# Patient Record
Sex: FEMALE | Race: WHITE | NOT HISPANIC OR LATINO | URBAN - METROPOLITAN AREA
[De-identification: names, ages, dates, MRNs, and addresses within clinical notes are randomized per-mention and may not be internally consistent; named-entity substitution may affect disease eponyms.]

---

## 2017-04-13 ENCOUNTER — OFFICE (OUTPATIENT)
Dept: URBAN - METROPOLITAN AREA CLINIC 19 | Facility: CLINIC | Age: 32
End: 2017-04-13

## 2017-04-13 VITALS
HEIGHT: 66 IN | SYSTOLIC BLOOD PRESSURE: 113 MMHG | HEART RATE: 73 BPM | DIASTOLIC BLOOD PRESSURE: 79 MMHG | WEIGHT: 148 LBS

## 2017-04-13 DIAGNOSIS — R10.12 LEFT UPPER QUADRANT PAIN: ICD-10-CM

## 2017-04-13 PROCEDURE — 99204 OFFICE O/P NEW MOD 45 MIN: CPT | Performed by: INTERNAL MEDICINE

## 2017-04-21 ENCOUNTER — OFFICE (OUTPATIENT)
Dept: URBAN - METROPOLITAN AREA CLINIC 22 | Facility: CLINIC | Age: 32
End: 2017-04-21

## 2017-04-21 DIAGNOSIS — R10.12 LEFT UPPER QUADRANT PAIN: ICD-10-CM

## 2017-04-21 PROCEDURE — 74177 CT ABD & PELVIS W/CONTRAST: CPT | Performed by: INTERNAL MEDICINE

## 2022-11-29 ENCOUNTER — OFFICE (OUTPATIENT)
Dept: URBAN - METROPOLITAN AREA CLINIC 19 | Facility: CLINIC | Age: 37
End: 2022-11-29
Payer: COMMERCIAL

## 2022-11-29 VITALS
HEIGHT: 66 IN | OXYGEN SATURATION: 97 % | HEART RATE: 94 BPM | WEIGHT: 141 LBS | DIASTOLIC BLOOD PRESSURE: 76 MMHG | SYSTOLIC BLOOD PRESSURE: 120 MMHG

## 2022-11-29 DIAGNOSIS — K62.5 HEMORRHAGE OF ANUS AND RECTUM: ICD-10-CM

## 2022-11-29 DIAGNOSIS — R19.5 OTHER FECAL ABNORMALITIES: ICD-10-CM

## 2022-11-29 DIAGNOSIS — R15.2 FECAL URGENCY: ICD-10-CM

## 2022-11-29 DIAGNOSIS — R19.4 CHANGE IN BOWEL HABIT: ICD-10-CM

## 2022-11-29 PROCEDURE — 99204 OFFICE O/P NEW MOD 45 MIN: CPT

## 2022-11-29 NOTE — SERVICENOTES
The patient's assessment was reviewed with Dr. Rucker and a collaborative plan of care was established.

## 2022-11-29 NOTE — SERVICEHPINOTES
36-year-old healthy single white female referred by her PCP for complaints of mucus and blood in her stool over the last month.  She admittedly started taking several new supplements ( omega-3 and vitamin E) a little over a month ago.  Shortly after starting these supplements, she started having increased mucus in her stool as well as blood in her stool and fecal urgency.  She had also been on antibiotics several weeks before after having a lymph node removed.  She discontinued the supplements recently and her symptoms actually have improved.  She does continue to have mucus in his noticed mucus in her stool in years past.  The blood is something she has not noticed before.  It is bright red blood and typically when she wipes.  She also was experiencing fecal urgency with multiple episodes of "looser" stools daily.  She denies any other GI symptoms, including reflux, heartburn, nausea, vomiting, dysphagia or abdominal pain.
br
br   She was last here 4/13/17 for complaints of LUQ pain.  CT abdomen/ pelvis 4/21/17 was unremarkable.  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 Family history is negative for IBD, pancreatitis or colon neoplasm.

## 2022-11-30 LAB
C-REACTIVE PROTEIN, QUANT: 1 MG/L (ref 0–10)
CBC, PLATELET, NO DIFFERENTIAL: HEMATOCRIT: 40.7 % (ref 34–46.6)
CBC, PLATELET, NO DIFFERENTIAL: HEMOGLOBIN: 13.5 G/DL (ref 11.1–15.9)
CBC, PLATELET, NO DIFFERENTIAL: MCH: 31 PG (ref 26.6–33)
CBC, PLATELET, NO DIFFERENTIAL: MCHC: 33.2 G/DL (ref 31.5–35.7)
CBC, PLATELET, NO DIFFERENTIAL: MCV: 94 FL (ref 79–97)
CBC, PLATELET, NO DIFFERENTIAL: NRBC: (no result)
CBC, PLATELET, NO DIFFERENTIAL: PLATELETS: 227 X10E3/UL (ref 150–450)
CBC, PLATELET, NO DIFFERENTIAL: RBC: 4.35 X10E6/UL (ref 3.77–5.28)
CBC, PLATELET, NO DIFFERENTIAL: RDW: 11.7 % (ref 11.7–15.4)
CBC, PLATELET, NO DIFFERENTIAL: WBC: 8.5 X10E3/UL (ref 3.4–10.8)
CELIAC DISEASE COMPREHENSIVE: DEAMIDATED GLIADIN ABS, IGA: 3 UNITS (ref 0–19)
CELIAC DISEASE COMPREHENSIVE: DEAMIDATED GLIADIN ABS, IGG: 2 UNITS (ref 0–19)
CELIAC DISEASE COMPREHENSIVE: ENDOMYSIAL ANTIBODY IGA: NEGATIVE
CELIAC DISEASE COMPREHENSIVE: IMMUNOGLOBULIN A, QN, SERUM: 370 MG/DL — HIGH (ref 87–352)
CELIAC DISEASE COMPREHENSIVE: T-TRANSGLUTAMINASE (TTG) IGA: <2 U/ML
CELIAC DISEASE COMPREHENSIVE: T-TRANSGLUTAMINASE (TTG) IGG: <2 U/ML
COMP. METABOLIC PANEL (14): A/G RATIO: 1.5 (ref 1.2–2.2)
COMP. METABOLIC PANEL (14): ALBUMIN: 4.2 G/DL (ref 3.8–4.8)
COMP. METABOLIC PANEL (14): ALKALINE PHOSPHATASE: 54 IU/L (ref 44–121)
COMP. METABOLIC PANEL (14): ALT (SGPT): 13 IU/L (ref 0–32)
COMP. METABOLIC PANEL (14): AST (SGOT): 17 IU/L (ref 0–40)
COMP. METABOLIC PANEL (14): BILIRUBIN, TOTAL: 0.5 MG/DL (ref 0–1.2)
COMP. METABOLIC PANEL (14): BUN/CREATININE RATIO: 15 (ref 9–23)
COMP. METABOLIC PANEL (14): BUN: 10 MG/DL (ref 6–20)
COMP. METABOLIC PANEL (14): CALCIUM: 9.2 MG/DL (ref 8.7–10.2)
COMP. METABOLIC PANEL (14): CARBON DIOXIDE, TOTAL: 21 MMOL/L (ref 20–29)
COMP. METABOLIC PANEL (14): CHLORIDE: 103 MMOL/L (ref 96–106)
COMP. METABOLIC PANEL (14): CREATININE: 0.68 MG/DL (ref 0.57–1)
COMP. METABOLIC PANEL (14): EGFR: 117 ML/MIN/1.73 (ref 59–?)
COMP. METABOLIC PANEL (14): GLOBULIN, TOTAL: 2.8 G/DL (ref 1.5–4.5)
COMP. METABOLIC PANEL (14): GLUCOSE: 85 MG/DL (ref 70–99)
COMP. METABOLIC PANEL (14): POTASSIUM: 4.6 MMOL/L (ref 3.5–5.2)
COMP. METABOLIC PANEL (14): PROTEIN, TOTAL: 7 G/DL (ref 6–8.5)
COMP. METABOLIC PANEL (14): SODIUM: 141 MMOL/L (ref 134–144)
HCG,BETA SUBUNIT, QNT: HCG,BETA SUBUNIT,QNT,SERUM: <1 MIU/ML
SEDIMENTATION RATE-WESTERGREN: 9 MM/HR (ref 0–32)

## 2022-12-03 LAB
GI PROFILE, STOOL, PCR: ADENOVIRUS F 40/41: NOT DETECTED
GI PROFILE, STOOL, PCR: ASTROVIRUS: NOT DETECTED
GI PROFILE, STOOL, PCR: C DIFFICILE TOXIN A/B: DETECTED
GI PROFILE, STOOL, PCR: CAMPYLOBACTER: NOT DETECTED
GI PROFILE, STOOL, PCR: CRYPTOSPORIDIUM: NOT DETECTED
GI PROFILE, STOOL, PCR: CYCLOSPORA CAYETANENSIS: NOT DETECTED
GI PROFILE, STOOL, PCR: E COLI O157: (no result)
GI PROFILE, STOOL, PCR: ENTAMOEBA HISTOLYTICA: NOT DETECTED
GI PROFILE, STOOL, PCR: ENTEROAGGREGATIVE E COLI: NOT DETECTED
GI PROFILE, STOOL, PCR: ENTEROPATHOGENIC E COLI: NOT DETECTED
GI PROFILE, STOOL, PCR: ENTEROTOXIGENIC E COLI: NOT DETECTED
GI PROFILE, STOOL, PCR: GIARDIA LAMBLIA: NOT DETECTED
GI PROFILE, STOOL, PCR: NOROVIRUS GI/GII: NOT DETECTED
GI PROFILE, STOOL, PCR: PLESIOMONAS SHIGELLOIDES: NOT DETECTED
GI PROFILE, STOOL, PCR: ROTAVIRUS A: NOT DETECTED
GI PROFILE, STOOL, PCR: SALMONELLA: NOT DETECTED
GI PROFILE, STOOL, PCR: SAPOVIRUS: NOT DETECTED
GI PROFILE, STOOL, PCR: SHIGA-TOXIN-PRODUCING E COLI: NOT DETECTED
GI PROFILE, STOOL, PCR: SHIGELLA/ENTEROINVASIVE E COLI: NOT DETECTED
GI PROFILE, STOOL, PCR: VIBRIO CHOLERAE: NOT DETECTED
GI PROFILE, STOOL, PCR: VIBRIO: NOT DETECTED
GI PROFILE, STOOL, PCR: YERSINIA ENTEROCOLITICA: NOT DETECTED

## 2022-12-28 ENCOUNTER — OFFICE (OUTPATIENT)
Dept: URBAN - METROPOLITAN AREA CLINIC 19 | Facility: CLINIC | Age: 37
End: 2022-12-28
Payer: COMMERCIAL

## 2022-12-28 VITALS
HEIGHT: 66 IN | WEIGHT: 141 LBS | DIASTOLIC BLOOD PRESSURE: 81 MMHG | HEART RATE: 60 BPM | OXYGEN SATURATION: 97 % | SYSTOLIC BLOOD PRESSURE: 121 MMHG

## 2022-12-28 DIAGNOSIS — A04.72 ENTEROCOLITIS DUE TO CLOSTRIDIUM DIFFICILE, NOT SPECIFIED AS: ICD-10-CM

## 2022-12-28 DIAGNOSIS — R19.4 CHANGE IN BOWEL HABIT: ICD-10-CM

## 2022-12-28 PROCEDURE — 99214 OFFICE O/P EST MOD 30 MIN: CPT

## 2022-12-28 NOTE — SERVICEHPINOTES
36-year-old healthy single white female returns for follow-up of her C difficile.
eve prado I initially saw her 11/29/22 as a referral from her PCP for complaints of mucus and blood in her stool over the previous month.  She admittedly started taking several new supplements ( omega-3 and vitamin E).  Shortly after starting these supplements, she started having increased mucus in her stool as well as blood in her stool and fecal urgency.  She had also been on antibiotics several weeks before after having a lymph node removed.  She discontinued the supplements and her symptoms actually improved.    Routine blood work 11/29/22 was unremarkable and molecular stool panel 11/30/22 was actually positive for C difficile.  I gave her a 10 day course of vancomycin which she has since completed.
eve prado   She returns today for routine follow-up after completing antibiotics.  She has not noticed any blood in her stool or mucus.  Her stools seem "fluffy", but she denies diarrhea.  She denies any other GI symptoms, including reflux, heartburn, nausea, vomiting, dysphagia or abdominal pain.  She has never had C diff prior to this in the past.  She was last here 4/13/17 for complaints of LUQ pain.  CT abdomen/ pelvis 4/21/17 was unremarkable.  Prior to this she was diagnosed with idiopathic pancreatitis at [facility] [place] in 2011. Family history is negative for IBD, pancreatitis or colon neoplasm.

## 2023-01-01 LAB — C DIFFICILE TOXINS A+B, EIA: NEGATIVE

## 2023-08-02 ENCOUNTER — OFFICE (OUTPATIENT)
Dept: URBAN - METROPOLITAN AREA CLINIC 19 | Facility: CLINIC | Age: 38
End: 2023-08-02

## 2023-08-02 VITALS
HEIGHT: 66 IN | DIASTOLIC BLOOD PRESSURE: 74 MMHG | OXYGEN SATURATION: 99 % | SYSTOLIC BLOOD PRESSURE: 117 MMHG | HEART RATE: 63 BPM | WEIGHT: 135 LBS

## 2023-08-02 DIAGNOSIS — Z87.19 PERSONAL HISTORY OF OTHER DISEASES OF THE DIGESTIVE SYSTEM: ICD-10-CM

## 2023-08-02 DIAGNOSIS — R10.84 GENERALIZED ABDOMINAL PAIN: ICD-10-CM

## 2023-08-02 PROCEDURE — 99214 OFFICE O/P EST MOD 30 MIN: CPT

## 2023-08-02 NOTE — SERVICEHPINOTES
37-year-old healthy single white female returns for follow-up evaluation of generalized intermittent abdominal pain.  She has had these symptoms for the last several weeks in addition to a rash that has been across her lower abdomen.  She initially saw her PCP about the symptoms and underwent a CT scan that was reportedly unremarkable.  The pain in her abdomen his sometimes in her RUQ and other times LUQ pain that radiates through to his back.  She was told by her PCP it is likely musculoskeletal.  The rash in her lower abdomen has improved.  She has been suffering from a unknown and idiopathic rash on bilateral upper extremities.  She has been seen by her dermatologist numerous times for this and been treated with various steroidal therapies.  This is finally resolved.  The rash on her abdomen is not painful and does not itch.  She denies any GI symptoms associated with her generalized abdominal pain, including nausea, vomiting, change in bowel habits or overt GI bleeding.  She has had idiopathic pancreatitis years ago and is concerned the pain she is experiencing is due to her pancreas.  However, recent CT scan was negative.  She drinks alcohol very rarely.
eve Angel initially saw her 11/29/22 as a referral from her PCP for complaints of mucus and blood in her stool over the previous month.  She admittedly started taking several new supplements ( omega-3 and vitamin E).  Shortly after starting these supplements, she started having increased mucus in her stool as well as blood in her stool and fecal urgency.  She had also been on antibiotics several weeks prior after having a lymph node removed.  She discontinued the supplements and her symptoms actually improved.    Routine blood work 11/29/22 was unremarkable and molecular stool panel 11/30/22 was actually positive for C difficile.  I gave her a 10 day course of vancomycin which she has since completed.  Repeat C difficile test 12/30/22 was negative.
eve prado  She was here 4/13/17 for complaints of LUQ pain.  CT abdomen/ pelvis 4/21/17 was unremarkable.  Prior to this she was diagnosed with idiopathic pancreatitis at [facility] [place] in 2011. Family history is negative for IBD, pancreatitis or colon neoplasm.

## 2023-08-03 LAB
AMYLASE: 51 U/L (ref 31–110)
C-REACTIVE PROTEIN, QUANT: <1 MG/L
CBC, PLATELET, NO DIFFERENTIAL: HEMATOCRIT: 39.1 % (ref 34–46.6)
CBC, PLATELET, NO DIFFERENTIAL: HEMOGLOBIN: 12.7 G/DL (ref 11.1–15.9)
CBC, PLATELET, NO DIFFERENTIAL: MCH: 30.5 PG (ref 26.6–33)
CBC, PLATELET, NO DIFFERENTIAL: MCHC: 32.5 G/DL (ref 31.5–35.7)
CBC, PLATELET, NO DIFFERENTIAL: MCV: 94 FL (ref 79–97)
CBC, PLATELET, NO DIFFERENTIAL: PLATELETS: 163 X10E3/UL (ref 150–450)
CBC, PLATELET, NO DIFFERENTIAL: RBC: 4.17 X10E6/UL (ref 3.77–5.28)
CBC, PLATELET, NO DIFFERENTIAL: RDW: 12.3 % (ref 11.7–15.4)
CBC, PLATELET, NO DIFFERENTIAL: WBC: 5.8 X10E3/UL (ref 3.4–10.8)
COMP. METABOLIC PANEL (14): A/G RATIO: 1.6 (ref 1.2–2.2)
COMP. METABOLIC PANEL (14): ALBUMIN: 4.2 G/DL (ref 3.9–4.9)
COMP. METABOLIC PANEL (14): ALKALINE PHOSPHATASE: 42 IU/L — LOW (ref 44–121)
COMP. METABOLIC PANEL (14): ALT (SGPT): 14 IU/L (ref 0–32)
COMP. METABOLIC PANEL (14): AST (SGOT): 17 IU/L (ref 0–40)
COMP. METABOLIC PANEL (14): BILIRUBIN, TOTAL: 0.7 MG/DL (ref 0–1.2)
COMP. METABOLIC PANEL (14): BUN/CREATININE RATIO: 14 (ref 9–23)
COMP. METABOLIC PANEL (14): BUN: 9 MG/DL (ref 6–20)
COMP. METABOLIC PANEL (14): CALCIUM: 9.2 MG/DL (ref 8.7–10.2)
COMP. METABOLIC PANEL (14): CARBON DIOXIDE, TOTAL: 22 MMOL/L (ref 20–29)
COMP. METABOLIC PANEL (14): CHLORIDE: 101 MMOL/L (ref 96–106)
COMP. METABOLIC PANEL (14): CREATININE: 0.65 MG/DL (ref 0.57–1)
COMP. METABOLIC PANEL (14): EGFR: 118 ML/MIN/1.73 (ref 59–?)
COMP. METABOLIC PANEL (14): GLOBULIN, TOTAL: 2.6 G/DL (ref 1.5–4.5)
COMP. METABOLIC PANEL (14): GLUCOSE: 86 MG/DL (ref 70–99)
COMP. METABOLIC PANEL (14): POTASSIUM: 4.6 MMOL/L (ref 3.5–5.2)
COMP. METABOLIC PANEL (14): PROTEIN, TOTAL: 6.8 G/DL (ref 6–8.5)
COMP. METABOLIC PANEL (14): SODIUM: 140 MMOL/L (ref 134–144)
HCG,BETA SUBUNIT, QNT: HCG,BETA SUBUNIT,QNT,SERUM: <1 MIU/ML
LIPASE: 18 U/L (ref 14–72)
SEDIMENTATION RATE-WESTERGREN: 2 MM/HR (ref 0–32)